# Patient Record
Sex: FEMALE | Race: WHITE | Employment: UNEMPLOYED | URBAN - METROPOLITAN AREA
[De-identification: names, ages, dates, MRNs, and addresses within clinical notes are randomized per-mention and may not be internally consistent; named-entity substitution may affect disease eponyms.]

---

## 2024-03-07 ENCOUNTER — APPOINTMENT (OUTPATIENT)
Dept: CT IMAGING | Age: 34
End: 2024-03-07
Payer: COMMERCIAL

## 2024-03-07 ENCOUNTER — HOSPITAL ENCOUNTER (EMERGENCY)
Age: 34
Discharge: HOME | End: 2024-03-07
Attending: EMERGENCY MEDICINE
Payer: COMMERCIAL

## 2024-03-07 VITALS
BODY MASS INDEX: 21.71 KG/M2 | RESPIRATION RATE: 18 BRPM | DIASTOLIC BLOOD PRESSURE: 90 MMHG | HEIGHT: 61 IN | SYSTOLIC BLOOD PRESSURE: 142 MMHG | OXYGEN SATURATION: 96 % | WEIGHT: 115 LBS | HEART RATE: 106 BPM | TEMPERATURE: 100 F

## 2024-03-07 DIAGNOSIS — K08.89 PAIN, DENTAL: Primary | ICD-10-CM

## 2024-03-07 PROCEDURE — 96374 THER/PROPH/DIAG INJ IV PUSH: CPT

## 2024-03-07 PROCEDURE — 6360000004 HC RX CONTRAST MEDICATION: Performed by: STUDENT IN AN ORGANIZED HEALTH CARE EDUCATION/TRAINING PROGRAM

## 2024-03-07 PROCEDURE — 6370000000 HC RX 637 (ALT 250 FOR IP): Performed by: STUDENT IN AN ORGANIZED HEALTH CARE EDUCATION/TRAINING PROGRAM

## 2024-03-07 PROCEDURE — 70491 CT SOFT TISSUE NECK W/DYE: CPT

## 2024-03-07 PROCEDURE — 99285 EMERGENCY DEPT VISIT HI MDM: CPT

## 2024-03-07 PROCEDURE — 6360000002 HC RX W HCPCS: Performed by: STUDENT IN AN ORGANIZED HEALTH CARE EDUCATION/TRAINING PROGRAM

## 2024-03-07 RX ORDER — KETOROLAC TROMETHAMINE 30 MG/ML
30 INJECTION, SOLUTION INTRAMUSCULAR; INTRAVENOUS ONCE
Status: COMPLETED | OUTPATIENT
Start: 2024-03-07 | End: 2024-03-07

## 2024-03-07 RX ORDER — AMOXICILLIN AND CLAVULANATE POTASSIUM 875; 125 MG/1; MG/1
1 TABLET, FILM COATED ORAL 2 TIMES DAILY
Qty: 14 TABLET | Refills: 0 | Status: SHIPPED | OUTPATIENT
Start: 2024-03-07 | End: 2024-03-14

## 2024-03-07 RX ORDER — ACETAMINOPHEN 500 MG
1000 TABLET ORAL 3 TIMES DAILY
Qty: 42 TABLET | Refills: 0 | Status: SHIPPED | OUTPATIENT
Start: 2024-03-07 | End: 2024-03-14

## 2024-03-07 RX ORDER — AMOXICILLIN AND CLAVULANATE POTASSIUM 875; 125 MG/1; MG/1
1 TABLET, FILM COATED ORAL ONCE
Status: COMPLETED | OUTPATIENT
Start: 2024-03-07 | End: 2024-03-07

## 2024-03-07 RX ORDER — IBUPROFEN 600 MG/1
600 TABLET ORAL 4 TIMES DAILY PRN
Qty: 28 TABLET | Refills: 0 | Status: SHIPPED | OUTPATIENT
Start: 2024-03-07 | End: 2024-03-14

## 2024-03-07 RX ADMIN — AMOXICILLIN AND CLAVULANATE POTASSIUM 1 TABLET: 875; 125 TABLET, FILM COATED ORAL at 02:45

## 2024-03-07 RX ADMIN — KETOROLAC TROMETHAMINE 30 MG: 30 INJECTION, SOLUTION INTRAMUSCULAR; INTRAVENOUS at 01:05

## 2024-03-07 RX ADMIN — IOPAMIDOL 75 ML: 755 INJECTION, SOLUTION INTRAVENOUS at 01:54

## 2024-03-07 ASSESSMENT — PAIN SCALES - GENERAL: PAINLEVEL_OUTOF10: 6

## 2024-03-07 ASSESSMENT — ENCOUNTER SYMPTOMS
NAUSEA: 0
SORE THROAT: 0
EYE REDNESS: 0
EYE DISCHARGE: 0
RHINORRHEA: 0
COLOR CHANGE: 0
DIARRHEA: 0
ABDOMINAL PAIN: 0
SINUS PRESSURE: 0
PHOTOPHOBIA: 0
EYE PAIN: 0
VOMITING: 0

## 2024-03-07 ASSESSMENT — PAIN - FUNCTIONAL ASSESSMENT: PAIN_FUNCTIONAL_ASSESSMENT: 0-10

## 2024-03-07 ASSESSMENT — PAIN DESCRIPTION - LOCATION: LOCATION: MOUTH

## 2024-03-07 NOTE — ED PROVIDER NOTES
her nose.  Also feels some fullness in her ears.  On exam she is awake alert answering questions.  She has obvious facial swelling over the right maxillary region.  Is slightly firm.  But no fluctuance.  Has poor dentition intraorally, with multiple fractured teeth and erythema of the gums.  There is edema of the gums, but no area of drainable fluctuance able to be palpated on the right side.  Posterior oropharynx clear with no erythema.  No submandibular or sublingual swelling.  Controlling secretions well.  No trismus.  Medical Decision Making  Multiple poor dentition, signs of erythema of the gums.  There is edema, but no fluctuance that feels amenable to drainage at this time.  However there is some facial swelling, unclear if this is abscess versus another process.  CT imaging ordered.  However no signs of Spencer's at this time.  CT imaging shows signs of right buccal cellulitis, with tiny apical abscesses but no obvious drainable abscess.  This is consistent with patient's exam given that the facial swelling feels firm, no fluctuance.  Will start on antibiotics, okay for discharge with strict return precautions    Problems Addressed:  Pain, dental: acute illness or injury    Amount and/or Complexity of Data Reviewed  Radiology: ordered and independent interpretation performed.    Risk  OTC drugs.  Prescription drug management.         Critical Care: None     Mary Bentley MD  Attending Emergency Physician        Mary Bentley MD  03/07/24 [id number]

## 2024-03-07 NOTE — ED NOTES
KHANG Johnson bedside for ultrasound IV  
Pt came in c/o mouth and facial pain  Left side of face is swollen  Pt states she is unable to eat  Vital signs documented     
Pt chart reviewed by writer.    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  
Pt to CT. Pt ambulated with CT tech  
7

## 2024-03-07 NOTE — DISCHARGE INSTRUCTIONS
Not all dental caries requires antibiotics.  Some conditions call pulpitis require you to see a dentist to provide follow up care which may require either extraction of the tooth or a root canal.     Take your medication as indicated and prescribed.  If you are given an antibiotic, then make sure you get the prescription filled and take the antibiotics until finished.  Drink plenty of water while taking the antibiotics.  Avoid drinking alcohol or drinks that have caffeine in it while taking antibiotics.       For pain use ibuprofen (Motrin / Advil) or acetaminophen (Tylenol), unless prescribed medications that have acetaminophen in it.  You can take over the counter acetaminophen tablets (1 - 2 tablets of the 500-mg strength every 6 hours) or ibuprofen tablets (2 tablets every 4 hours).    PLEASE RETURN TO THE EMERGENCY DEPARTMENT IMMEDIATELY for worsening symptoms, swelling to your face, redness on your face, drainage from the tooth, or if you develop any concerning symptoms such as: high fever not relieved by acetaminophen (Tylenol) and/or ibuprofen (Motrin / Advil), chills, shortness of breath, chest pain, feeling of your heart fluttering or racing, persistent nausea and/or vomiting, vomiting up blood, blood in your stool, numbness, loss of consciousness, weakness or tingling in the arms or legs or change in color of the extremities, changes in mental status, persistent headache, blurry vision, loss of bladder / bowel control, unable to follow up with your physician, or other any other care or concern.    Dental Clinics:    [facility]  [address]  [phone]    [facility]  [address]  [phone]  Open 8am-4:30pm  (appt exam & xrays $37.00)    [facility]  (Service for the homeless)  [address].   [phone]    Dentist who take medicaid:    Sánchez Amanda  [address]  [phone] call 9a-11a  For appt.    Jarrett Holder  [address]  [phone]  call 9a-11a  For appt.    [facility]  [address]  [phone]  (takes [employer] w/PCP referral  Only one who accepts FHP)    Alex Mills DDS  [facility]  [phone]  [email or web address]    [facility]  [address] [place], [place]  [phone]  Accepts medicaid, low cost exams,  Fillings, cleanings, x-rays, sealants  Open Mon-Fri 8a-5p, open one evening  A week for appts.    Pediatric Dentist:    Bryce Mcnair  [address]  [phone] accepts Medicaid  (Only children 12 and under)    [facility]  [address]  [phone]  (Only children 12 and under)    [facility]  [address], [place]  [phone]  (ages 3-18yrs only)    [facility]  Infants & Teens  Dental Care  [address] [place], [place]  [phone]  [email or web address]    [facility]  [phone] or  [phone]    Dental Referral Services  [phone]    Dentist Accepting New Non-Medicaid Patients:    Vladimir Calderon  [address]  [phone]    Adalberto Sullivan  [address]  [phone]    Oral Surgeon's:    Timo Ramirez Ziegler  [address]   [phone]   (Takes [place] Medicaid)    Dr. Abdi Gibson  [address]  [phone]  (Takes [facility]/Medicaid)    [facility]  [phone]  [address]  Chris [address]

## 2024-03-07 NOTE — ED PROVIDER NOTES
Extraocular Movements: Extraocular movements intact.      Pupils: Pupils are equal, round, and reactive to light.      Comments: No proptosis   Cardiovascular:      Rate and Rhythm: Normal rate.      Pulses: Normal pulses.   Pulmonary:      Effort: No respiratory distress.   Musculoskeletal:         General: No swelling, tenderness, deformity or signs of injury.      Cervical back: No rigidity or tenderness.      Right lower leg: No edema.      Left lower leg: No edema.   Skin:     Capillary Refill: Capillary refill takes less than 2 seconds.      Coloration: Skin is not jaundiced or pale.      Findings: No bruising, erythema, lesion or rash.   Neurological:      Mental Status: She is oriented to person, place, and time.           DDX/DIAGNOSTIC RESULTS / EMERGENCY DEPARTMENT COURSE / MDM     Medical Decision Making  33-year-old female presenting with dental pain.  On exam she has abnormal mass In the right upper mandible region on the upper jaw.  It is firm in nature.  This could be salivary stones but does feel slightly hide to be a parotid gland stone.  Given the location as well as extremely poor dentition will obtain CT imaging of the jaw with IV contrast.  Will give analgesia and reassess.  Differential diagnosis dental caries, pulpitis, dental abscess, Spencer's angina, parotid stones    Amount and/or Complexity of Data Reviewed  Radiology: ordered.    Risk  OTC drugs.  Prescription drug management.  Risk Details: CT imaging showing some small apical abscesses that are not amenable to drainage.  There are some findings of cellulitis as well.  Patient given prescription for Augmentin.  Patient and list of dentist.  Patient amenable discharge voiced understanding extremity return precautions          EMERGENCY DEPARTMENT COURSE:           PROCEDURES:      CONSULTS:  None    CRITICAL CARE:  There was significant risk of life threatening deterioration of patient's condition requiring my direct management.  Critical care time  minutes, excluding any documented procedures.    FINAL IMPRESSION      1. Pain, dental          DISPOSITION / PLAN     DISPOSITION Decision To Discharge 03/07/2024 02:41:58 AM      PATIENT REFERRED TO:  [facility]  [address] [address]  [phone]    If symptoms worsen      DISCHARGE MEDICATIONS:  Discharge Medication List as of 3/7/2024  2:43 AM        START taking these medications    Details   amoxicillin-clavulanate (AUGMENTIN) 875-125 MG per tablet Take 1 tablet by mouth 2 times daily for 7 days, Disp-14 tablet, R-0Normal      acetaminophen (TYLENOL) 500 MG tablet Take 2 tablets by mouth 3 times daily for 7 days, Disp-42 tablet, R-0Normal      ibuprofen (ADVIL;MOTRIN) 600 MG tablet Take 1 tablet by mouth 4 times daily as needed for Pain, Disp-28 tablet, R-0Normal             Zhen Sr, DO  Emergency Medicine Resident    (Please note that portions of this note were completed with a voice recognition program.  Efforts were made to edit the dictations but occasionally words are mis-transcribed.)

## 2024-09-13 ENCOUNTER — HOSPITAL ENCOUNTER (EMERGENCY)
Age: 34
Discharge: HOME | End: 2024-09-13
Attending: EMERGENCY MEDICINE
Payer: COMMERCIAL

## 2024-09-13 VITALS
SYSTOLIC BLOOD PRESSURE: 131 MMHG | HEART RATE: 102 BPM | OXYGEN SATURATION: 96 % | DIASTOLIC BLOOD PRESSURE: 90 MMHG | RESPIRATION RATE: 17 BRPM | TEMPERATURE: 98.4 F

## 2024-09-13 DIAGNOSIS — Z53.21 ELOPED FROM EMERGENCY DEPARTMENT: Primary | ICD-10-CM

## 2024-09-13 LAB
FLUAV AG SPEC QL: NEGATIVE
FLUBV AG SPEC QL: NEGATIVE
SARS-COV-2 RDRP RESP QL NAA+PROBE: NOT DETECTED
SPECIMEN DESCRIPTION: NORMAL

## 2024-09-13 PROCEDURE — 87804 INFLUENZA ASSAY W/OPTIC: CPT

## 2024-09-13 PROCEDURE — 99283 EMERGENCY DEPT VISIT LOW MDM: CPT

## 2024-09-13 PROCEDURE — 87635 SARS-COV-2 COVID-19 AMP PRB: CPT

## 2024-09-13 RX ORDER — 0.9 % SODIUM CHLORIDE 0.9 %
1000 INTRAVENOUS SOLUTION INTRAVENOUS ONCE
Status: DISCONTINUED | OUTPATIENT
Start: 2024-09-13 | End: 2024-09-13 | Stop reason: HOSPADM